# Patient Record
Sex: FEMALE | Race: WHITE
[De-identification: names, ages, dates, MRNs, and addresses within clinical notes are randomized per-mention and may not be internally consistent; named-entity substitution may affect disease eponyms.]

---

## 2020-11-23 ENCOUNTER — HOSPITAL ENCOUNTER (OUTPATIENT)
Dept: HOSPITAL 63 - US | Age: 75
End: 2020-11-23
Attending: FAMILY MEDICINE
Payer: MEDICARE

## 2020-11-23 DIAGNOSIS — R74.8: ICD-10-CM

## 2020-11-23 DIAGNOSIS — K80.20: Primary | ICD-10-CM

## 2020-11-23 PROCEDURE — 76700 US EXAM ABDOM COMPLETE: CPT

## 2020-11-23 NOTE — RAD
INDICATION: Reason: ABN SERUM ENZYME LEVELS / Spl. Instructions:  / 

History: 

 

COMPARISON: None.

 

TECHNIQUE: Grayscale and color ultrasound images obtained through the 

abdomen.

 

FINDINGS:

 

Aorta/IVC: Limited visualization secondary to overlying structures 

obscuring. Atherosclerotic disease.

Pancreas: Not well seen secondary to bowel gas.

Liver: Echogenic and prominent in size. Hypoechoic region adjacent to 

gallbladder.

Gallbladder: Gallstones are visualized.

Common Bile Duct: Not dilated.

Right Kidney: No hydronephrosis.

Left Kidney: No hydronephrosis.

Spleen: Limited visualization secondary to overlying structures obscuring.

Does not appear grossly enlarged. Portion of it not well seen.

 

IMPRESSION: 

 

*  Echogenic liver which is commonly secondary to fatty infiltration.

 

*  Hypoechoic region within the left lobe the liver with an ill-defined 

appearance. This is nonspecific in nature but commonly secondary to focal 

fatty sparing although a hypoechoic liver lesion could have this 

appearance. If further evaluation is desired MRI could further assess.

 

*  Gallstone is visualized without common bile duct dilation

 

Electronically signed by: Wilfredo Huff MD (11/23/2020 8:53 AM) [id number]